# Patient Record
Sex: MALE | Race: WHITE | Employment: PART TIME | URBAN - NONMETROPOLITAN AREA
[De-identification: names, ages, dates, MRNs, and addresses within clinical notes are randomized per-mention and may not be internally consistent; named-entity substitution may affect disease eponyms.]

---

## 2019-05-05 ENCOUNTER — HOSPITAL ENCOUNTER (EMERGENCY)
Age: 19
Discharge: HOME | End: 2019-05-05
Payer: COMMERCIAL

## 2019-05-05 VITALS
SYSTOLIC BLOOD PRESSURE: 145 MMHG | WEIGHT: 140 LBS | RESPIRATION RATE: 16 BRPM | HEIGHT: 72 IN | HEART RATE: 104 BPM | TEMPERATURE: 97.7 F | OXYGEN SATURATION: 100 % | DIASTOLIC BLOOD PRESSURE: 83 MMHG | BODY MASS INDEX: 18.96 KG/M2

## 2019-05-05 DIAGNOSIS — K40.90 RIGHT INGUINAL HERNIA: Primary | ICD-10-CM

## 2019-05-05 PROCEDURE — 99202 OFFICE O/P NEW SF 15 MIN: CPT

## 2019-05-05 PROCEDURE — 99203 OFFICE O/P NEW LOW 30 MIN: CPT | Performed by: NURSE PRACTITIONER

## 2019-05-05 SDOH — HEALTH STABILITY: MENTAL HEALTH: HOW OFTEN DO YOU HAVE A DRINK CONTAINING ALCOHOL?: NEVER

## 2019-05-05 ASSESSMENT — ENCOUNTER SYMPTOMS
NAUSEA: 0
SHORTNESS OF BREATH: 0
VOMITING: 0

## 2019-05-05 NOTE — ED TRIAGE NOTES
Patient states he has pain in the lower mid abdomen that travels to his testicles. Pain is intermittent and is not in any pain at this time.

## 2019-05-05 NOTE — ED NOTES
Discharge instructions reviewed with pt. Pt verbalized understanding. Pt ambulated out in stable condition. Assessment unchanged upon discharge.      Teofilo Desir RN  05/05/19 5135

## 2019-05-05 NOTE — ED PROVIDER NOTES
from the following:    Height as of this encounter: 6' (1.829 m). Weight as of this encounter: 140 lb (63.5 kg). ,No LMP for male patient. Physical Exam   Constitutional: He is oriented to person, place, and time. He appears well-developed and well-nourished. He is cooperative. No distress. HENT:   Head: Normocephalic and atraumatic. Pulmonary/Chest: Effort normal. No respiratory distress. Abdominal: Soft. Bowel sounds are normal. There is no tenderness. A hernia is present. Hernia confirmed positive in the right inguinal area. Neurological: He is alert and oriented to person, place, and time. Skin: Skin is warm and dry. Psychiatric: He has a normal mood and affect. His speech is normal and behavior is normal.   Nursing note and vitals reviewed. DIAGNOSTIC RESULTS     Labs:No results found for this visit on 05/05/19. IMAGING:    No orders to display         URGENT CARE COURSE:     Vitals:    05/05/19 1525   BP: (!) 145/83   Pulse: 104   Resp: 16   Temp: 97.7 °F (36.5 °C)   SpO2: 100%   Weight: 140 lb (63.5 kg)   Height: 6' (1.829 m)       Medications - No data to display         PROCEDURES:  None    FINAL IMPRESSION      1. Right inguinal hernia          DISPOSITION/ PLAN     Patient presents with a right inguinal hernia. Currently, the hernia is knots present except for a small area in the upper inguinal area. The patient states the hernia doesn't times extend into his scrotum. He is able to reduce the hernia when it does appear. He was given a referral to Dr. Julien Leventhal for evaluation and possible repair of the hernia. He is to go to ER if he is unable to reduce the hernia or has any significant pain with a hernia. All questions were answered and the patient was in agreement with plan. He was discharged from the urgent care center in good condition. PATIENT REFERRED TO:  No primary care provider on file. No primary physician on file.       DISCHARGE MEDICATIONS:  There are no discharge medications for this patient. There are no discharge medications for this patient. There are no discharge medications for this patient.       DICKSON Ibrahim - CNP    (Please note that portions of this note were completed with a voice recognition program. Efforts were made to edit the dictations but occasionally words are mis-transcribed.)         DICKSON Ibrahim - THUY  05/05/19 8157

## 2019-05-09 ENCOUNTER — OFFICE VISIT (OUTPATIENT)
Dept: SURGERY | Age: 19
End: 2019-05-09
Payer: COMMERCIAL

## 2019-05-09 ENCOUNTER — TELEPHONE (OUTPATIENT)
Dept: SURGERY | Age: 19
End: 2019-05-09

## 2019-05-09 VITALS
TEMPERATURE: 97.3 F | SYSTOLIC BLOOD PRESSURE: 106 MMHG | OXYGEN SATURATION: 98 % | HEIGHT: 72 IN | RESPIRATION RATE: 16 BRPM | WEIGHT: 139 LBS | BODY MASS INDEX: 18.83 KG/M2 | DIASTOLIC BLOOD PRESSURE: 68 MMHG | HEART RATE: 95 BPM

## 2019-05-09 DIAGNOSIS — K40.90 INGUINAL HERNIA, RIGHT: Primary | ICD-10-CM

## 2019-05-09 PROCEDURE — 99203 OFFICE O/P NEW LOW 30 MIN: CPT | Performed by: SURGERY

## 2019-05-09 ASSESSMENT — ENCOUNTER SYMPTOMS
STRIDOR: 0
COUGH: 0
GASTROINTESTINAL NEGATIVE: 1
TROUBLE SWALLOWING: 0
SINUS PAIN: 0
SINUS PRESSURE: 0
EYE REDNESS: 0
SHORTNESS OF BREATH: 0
EYE DISCHARGE: 0
RESPIRATORY NEGATIVE: 1
WHEEZING: 0
FACIAL SWELLING: 0
APNEA: 0
CHOKING: 0
BACK PAIN: 0
EYE ITCHING: 0
PHOTOPHOBIA: 0
COLOR CHANGE: 0
RHINORRHEA: 0
EYE PAIN: 0
CHEST TIGHTNESS: 0
SORE THROAT: 0
EYES NEGATIVE: 1
ALLERGIC/IMMUNOLOGIC NEGATIVE: 1
VOICE CHANGE: 0

## 2019-05-09 NOTE — TELEPHONE ENCOUNTER
Scheduled Onita Loud for an open right inguinal hernia repair on Fri 5/17 at 10am & he will arrive at 8:30. Consent was signed.

## 2019-05-10 NOTE — PROGRESS NOTES
[address] Akira Thomas [address]  [address] [address]  Dept: [phone]  Dept Fax: [phone]  Loc: [phone]    Visit Date: 5/9/2019    Mis Mariscal is a 22 y.o. male who presentstoday for:  Chief Complaint   Patient presents with    Surgical Consult     new pt-was in urgent care 5/5-rt inguinal hernia-       HPI:     HPI 69-year-old white male who recently went to the urgent care center because he is having problems with a right inguinal hernia. He indicated to me he is had a swollen testicle for most of his life and was familiar with the concept of a hydrocele. Recently it is gotten larger he states that in the morning hernia and scrotal swelling is gone and throughout the day and at night scrotum is larger with a bigger bulge in the right groin he has no voiding abnormalities denies any family history of hernias patient works full time and also goes to school full-time    History reviewed. No pertinent past medical history. Past Surgical History:   Procedure Laterality Date    COCHLEAR IMPLANT  2017    left      History reviewed. No pertinent family history. Social History     Tobacco Use    Smoking status: Never Smoker    Smokeless tobacco: Never Used   Substance Use Topics    Alcohol use: Never     Frequency: Never          No current outpatient medications on file. No current facility-administered medications for this visit. No Known Allergies    Subjective:      Review of Systems   Constitutional: Negative. Negative for activity change, appetite change, chills, diaphoresis, fatigue, fever and unexpected weight change. HENT: Negative. Negative for congestion, dental problem, drooling, ear discharge, ear pain, facial swelling, hearing loss, mouth sores, nosebleeds, postnasal drip, rhinorrhea, sinus pressure, sinus pain, sneezing, sore throat, tinnitus, trouble swallowing and voice change. Eyes: Negative.   Negative for photophobia, pain, discharge, redness, itching and visual disturbance. Respiratory: Negative. Negative for apnea, cough, choking, chest tightness, shortness of breath, wheezing and stridor. Cardiovascular: Negative. Negative for chest pain, palpitations and leg swelling. Gastrointestinal: Negative. Endocrine: Negative. Negative for cold intolerance, heat intolerance, polydipsia, polyphagia and polyuria. Genitourinary: Negative. Negative for decreased urine volume, difficulty urinating, discharge, dysuria, enuresis, flank pain, frequency, genital sores, hematuria, penile pain, penile swelling, scrotal swelling, testicular pain and urgency. Musculoskeletal: Negative. Negative for arthralgias, back pain, gait problem, joint swelling, myalgias, neck pain and neck stiffness. Skin: Negative. Negative for color change, pallor, rash and wound. Allergic/Immunologic: Negative. Negative for environmental allergies, food allergies and immunocompromised state. Neurological: Negative. Negative for dizziness, tremors, seizures, syncope, facial asymmetry, speech difficulty, weakness, light-headedness, numbness and headaches. Hematological: Negative. Negative for adenopathy. Does not bruise/bleed easily. Psychiatric/Behavioral: Negative. Negative for agitation, behavioral problems, confusion, decreased concentration, dysphoric mood, hallucinations, self-injury, sleep disturbance and suicidal ideas. The patient is not nervous/anxious and is not hyperactive. Objective:   /68 (Site: Left Upper Arm, Position: Sitting, Cuff Size: Medium Adult)   Pulse 95   Temp 97.3 °F (36.3 °C) (Tympanic)   Resp 16   Ht 6' (1.829 m)   Wt 139 lb (63 kg)   SpO2 98%   BMI 18.85 kg/m²     Physical Exam   Constitutional: He is oriented to person, place, and time. He appears well-developed and well-nourished. HENT:   Head: Normocephalic and atraumatic.    Eyes: Pupils are equal, round, and reactive to light. Conjunctivae and EOM are normal. Left eye exhibits no discharge. No scleral icterus. Neck: No tracheal deviation present. No thyromegaly present. Cardiovascular: Normal rate, regular rhythm and normal heart sounds. Exam reveals no gallop and no friction rub. No murmur heard. Pulmonary/Chest: Effort normal and breath sounds normal. No respiratory distress. He has no wheezes. He has no rales. He exhibits no tenderness. Genitourinary:         Musculoskeletal: Normal range of motion. He exhibits no edema or tenderness. Lymphadenopathy:     He has no cervical adenopathy. Neurological: He is alert and oriented to person, place, and time. Skin: Skin is warm and dry. No rash noted. No erythema. No pallor. Psychiatric: He has a normal mood and affect. His behavior is normal. Judgment and thought content normal.        No results found for this or any previous visit. Assessment:     Right inguinal hernia with communicating hydrocele but apparently has been present a good part of his life we discussed hernias and the fact that he has an indirect hernia we discussed the options of repair with robotic-assisted versus open my concern is that it might be difficult to reduce robotically an indirect hernia that goes all way down into the scrotum as far as the hernia sac we discussed the open repair use of possible mesh he would like to proceed    Plan:     1. Schedule Meli Cabrera for open repair of right inguinal hernia  2. The risks, benefits and alternatives were discussed with Meli Cabrera. He understands and wishes to proceed with surgical intervention. 3. Restrictions discussed with Meli Cabrera and he expresses understanding. 4. He is advised to call back directly if there are further questions/concerns, or if his symptoms worsen prior to surgery. Electronicallysigned by Malik Robertson MD on 5/9/2019 at 9:51 PM Patient seen and examined independently by me. Above discussed and I agree with CNP.    Labs, cultures, and radiographs where available were reviewed. See orders for the updated patient care plan.     Nathan Flynn MD,   5/9/2019   9:56 PM open repair of right inguinal hernia

## 2019-05-13 ENCOUNTER — TELEPHONE (OUTPATIENT)
Dept: SURGERY | Age: 19
End: 2019-05-13

## 2019-05-13 NOTE — TELEPHONE ENCOUNTER
Scheduled Russel Hatch for an open right inguinal hernia repair on Fri 5/17 at 10 & he will arrive at 8:30. He will be npo after midnight, consent was signed. Antibacterial soap was given.

## 2019-05-14 ENCOUNTER — TELEPHONE (OUTPATIENT)
Dept: SURGERY | Age: 19
End: 2019-05-14

## 2019-05-14 DIAGNOSIS — K40.90 INGUINAL HERNIA, RIGHT: Primary | ICD-10-CM

## 2019-05-16 NOTE — H&P
[address] Akira Thomas [address]  [address] [address]  Dept: [phone]  Dept Fax: [phone]  Loc: [phone]    Visit Date: 5/9/2019    No Andres is a 22 y.o. male who presentstoday for:  Chief Complaint   Patient presents with    Surgical Consult     new pt-was in urgent care 5/5-rt inguinal hernia-       HPI:     HPI 65-year-old white male who recently went to the urgent care center because he is having problems with a right inguinal hernia. He indicated to me he is had a swollen testicle for most of his life and was familiar with the concept of a hydrocele. Recently it is gotten larger he states that in the morning hernia and scrotal swelling is gone and throughout the day and at night scrotum is larger with a bigger bulge in the right groin he has no voiding abnormalities denies any family history of hernias patient works full time and also goes to school full-time    History reviewed. No pertinent past medical history. Past Surgical History:   Procedure Laterality Date    COCHLEAR IMPLANT  2017    left      History reviewed. No pertinent family history. Social History     Tobacco Use    Smoking status: Never Smoker    Smokeless tobacco: Never Used   Substance Use Topics    Alcohol use: Never     Frequency: Never          No current outpatient medications on file. No current facility-administered medications for this visit. No Known Allergies    Subjective:      Review of Systems   Constitutional: Negative. Negative for activity change, appetite change, chills, diaphoresis, fatigue, fever and unexpected weight change. HENT: Negative. Negative for congestion, dental problem, drooling, ear discharge, ear pain, facial swelling, hearing loss, mouth sores, nosebleeds, postnasal drip, rhinorrhea, sinus pressure, sinus pain, sneezing, sore throat, tinnitus, trouble swallowing and voice change. Eyes: Negative.   Negative for photophobia, pain, discharge, redness, itching and visual disturbance. Respiratory: Negative. Negative for apnea, cough, choking, chest tightness, shortness of breath, wheezing and stridor. Cardiovascular: Negative. Negative for chest pain, palpitations and leg swelling. Gastrointestinal: Negative. Endocrine: Negative. Negative for cold intolerance, heat intolerance, polydipsia, polyphagia and polyuria. Genitourinary: Negative. Negative for decreased urine volume, difficulty urinating, discharge, dysuria, enuresis, flank pain, frequency, genital sores, hematuria, penile pain, penile swelling, scrotal swelling, testicular pain and urgency. Musculoskeletal: Negative. Negative for arthralgias, back pain, gait problem, joint swelling, myalgias, neck pain and neck stiffness. Skin: Negative. Negative for color change, pallor, rash and wound. Allergic/Immunologic: Negative. Negative for environmental allergies, food allergies and immunocompromised state. Neurological: Negative. Negative for dizziness, tremors, seizures, syncope, facial asymmetry, speech difficulty, weakness, light-headedness, numbness and headaches. Hematological: Negative. Negative for adenopathy. Does not bruise/bleed easily. Psychiatric/Behavioral: Negative. Negative for agitation, behavioral problems, confusion, decreased concentration, dysphoric mood, hallucinations, self-injury, sleep disturbance and suicidal ideas. The patient is not nervous/anxious and is not hyperactive. Objective:   /68 (Site: Left Upper Arm, Position: Sitting, Cuff Size: Medium Adult)   Pulse 95   Temp 97.3 °F (36.3 °C) (Tympanic)   Resp 16   Ht 6' (1.829 m)   Wt 139 lb (63 kg)   SpO2 98%   BMI 18.85 kg/m²     Physical Exam   Constitutional: He is oriented to person, place, and time. He appears well-developed and well-nourished. HENT:   Head: Normocephalic and atraumatic.    Eyes: Pupils are equal, round, and reactive to light. Conjunctivae and EOM are normal. Left eye exhibits no discharge. No scleral icterus. Neck: No tracheal deviation present. No thyromegaly present. Cardiovascular: Normal rate, regular rhythm and normal heart sounds. Exam reveals no gallop and no friction rub. No murmur heard. Pulmonary/Chest: Effort normal and breath sounds normal. No respiratory distress. He has no wheezes. He has no rales. He exhibits no tenderness. Genitourinary:         Musculoskeletal: Normal range of motion. He exhibits no edema or tenderness. Lymphadenopathy:     He has no cervical adenopathy. Neurological: He is alert and oriented to person, place, and time. Skin: Skin is warm and dry. No rash noted. No erythema. No pallor. Psychiatric: He has a normal mood and affect. His behavior is normal. Judgment and thought content normal.        No results found for this or any previous visit. Assessment:     Right inguinal hernia with communicating hydrocele but apparently has been present a good part of his life we discussed hernias and the fact that he has an indirect hernia we discussed the options of repair with robotic-assisted versus open my concern is that it might be difficult to reduce robotically an indirect hernia that goes all way down into the scrotum as far as the hernia sac we discussed the open repair use of possible mesh he would like to proceed    Plan:     1. Schedule Meli Cabrera for open repair of right inguinal hernia  2. The risks, benefits and alternatives were discussed with Meli Cabrera. He understands and wishes to proceed with surgical intervention. 3. Restrictions discussed with Meli Cabrera and he expresses understanding. 4. He is advised to call back directly if there are further questions/concerns, or if his symptoms worsen prior to surgery. Electronicallysigned by Malik Robertson MD on 5/9/2019 at 9:51 PM Patient seen and examined independently by me. Above discussed and I agree with CNP.    Labs, cultures, and radiographs where available were reviewed. See orders for the updated patient care plan.     Sven Maher MD,   5/9/2019   9:56 PM open repair of right inguinal hernia

## 2019-05-16 NOTE — H&P
[facility]  History and Physical Update      Pt Name: No Andres  MRN: [id number]  YOB: 2000  Date of evaluation: 5/16/2019    [x] I have examined the patient and reviewed the H&P/Consult and there are no changes to the patient or plans. [] I have examined the patient and reviewed the H&P/Consult and have noted the following changes:         Eugenia Shaffer  Electronically signed 5/16/2019 at 10:47 AM

## 2019-05-17 ENCOUNTER — ANESTHESIA EVENT (OUTPATIENT)
Dept: OPERATING ROOM | Age: 19
End: 2019-05-17
Payer: COMMERCIAL

## 2019-05-17 ENCOUNTER — ANESTHESIA (OUTPATIENT)
Dept: OPERATING ROOM | Age: 19
End: 2019-05-17
Payer: COMMERCIAL

## 2019-05-17 ENCOUNTER — HOSPITAL ENCOUNTER (OUTPATIENT)
Age: 19
Setting detail: OUTPATIENT SURGERY
Discharge: HOME | End: 2019-05-17
Attending: SURGERY | Admitting: SURGERY
Payer: COMMERCIAL

## 2019-05-17 VITALS
OXYGEN SATURATION: 99 % | TEMPERATURE: 97.7 F | RESPIRATION RATE: 18 BRPM | SYSTOLIC BLOOD PRESSURE: 109 MMHG | DIASTOLIC BLOOD PRESSURE: 60 MMHG

## 2019-05-17 VITALS
BODY MASS INDEX: 17.95 KG/M2 | TEMPERATURE: 97.4 F | OXYGEN SATURATION: 100 % | DIASTOLIC BLOOD PRESSURE: 78 MMHG | SYSTOLIC BLOOD PRESSURE: 138 MMHG | WEIGHT: 132.5 LBS | HEIGHT: 72 IN | HEART RATE: 79 BPM | RESPIRATION RATE: 18 BRPM

## 2019-05-17 DIAGNOSIS — K40.90 NON-RECURRENT UNILATERAL INGUINAL HERNIA WITHOUT OBSTRUCTION OR GANGRENE: Primary | ICD-10-CM

## 2019-05-17 PROCEDURE — 7100000000 HC PACU RECOVERY - FIRST 15 MIN: Performed by: SURGERY

## 2019-05-17 PROCEDURE — C1781 MESH (IMPLANTABLE): HCPCS | Performed by: SURGERY

## 2019-05-17 PROCEDURE — 3700000000 HC ANESTHESIA ATTENDED CARE: Performed by: SURGERY

## 2019-05-17 PROCEDURE — 3700000001 HC ADD 15 MINUTES (ANESTHESIA): Performed by: SURGERY

## 2019-05-17 PROCEDURE — 2709999900 HC NON-CHARGEABLE SUPPLY: Performed by: SURGERY

## 2019-05-17 PROCEDURE — 7100000010 HC PHASE II RECOVERY - FIRST 15 MIN: Performed by: SURGERY

## 2019-05-17 PROCEDURE — 49505 PRP I/HERN INIT REDUC >5 YR: CPT | Performed by: SURGERY

## 2019-05-17 PROCEDURE — 3600000002 HC SURGERY LEVEL 2 BASE: Performed by: SURGERY

## 2019-05-17 PROCEDURE — 2580000003 HC RX 258: Performed by: NURSE ANESTHETIST, CERTIFIED REGISTERED

## 2019-05-17 PROCEDURE — 2500000003 HC RX 250 WO HCPCS: Performed by: NURSE ANESTHETIST, CERTIFIED REGISTERED

## 2019-05-17 PROCEDURE — 2580000003 HC RX 258

## 2019-05-17 PROCEDURE — 6360000002 HC RX W HCPCS: Performed by: NURSE ANESTHETIST, CERTIFIED REGISTERED

## 2019-05-17 PROCEDURE — 3600000012 HC SURGERY LEVEL 2 ADDTL 15MIN: Performed by: SURGERY

## 2019-05-17 PROCEDURE — 7100000011 HC PHASE II RECOVERY - ADDTL 15 MIN: Performed by: SURGERY

## 2019-05-17 PROCEDURE — 55060 REPAIR OF HYDROCELE: CPT | Performed by: SURGERY

## 2019-05-17 PROCEDURE — 6360000002 HC RX W HCPCS

## 2019-05-17 PROCEDURE — 2500000003 HC RX 250 WO HCPCS: Performed by: SURGERY

## 2019-05-17 PROCEDURE — 6360000002 HC RX W HCPCS: Performed by: ANESTHESIOLOGY

## 2019-05-17 PROCEDURE — 7100000001 HC PACU RECOVERY - ADDTL 15 MIN: Performed by: SURGERY

## 2019-05-17 DEVICE — MESH HERN W1XL4IN INGUINAL POLYPR MFIL RECTANG: Type: IMPLANTABLE DEVICE | Site: GROIN | Status: FUNCTIONAL

## 2019-05-17 RX ORDER — DIPHENHYDRAMINE HYDROCHLORIDE 50 MG/ML
12.5 INJECTION INTRAMUSCULAR; INTRAVENOUS
Status: DISCONTINUED | OUTPATIENT
Start: 2019-05-17 | End: 2019-05-17 | Stop reason: HOSPADM

## 2019-05-17 RX ORDER — CEFAZOLIN SODIUM 1 G/50ML
1 INJECTION, SOLUTION INTRAVENOUS
Status: COMPLETED | OUTPATIENT
Start: 2019-05-17 | End: 2019-05-17

## 2019-05-17 RX ORDER — MIDAZOLAM HYDROCHLORIDE 1 MG/ML
INJECTION INTRAMUSCULAR; INTRAVENOUS PRN
Status: DISCONTINUED | OUTPATIENT
Start: 2019-05-17 | End: 2019-05-17 | Stop reason: SDUPTHER

## 2019-05-17 RX ORDER — LABETALOL HYDROCHLORIDE 5 MG/ML
5 INJECTION, SOLUTION INTRAVENOUS EVERY 5 MIN PRN
Status: DISCONTINUED | OUTPATIENT
Start: 2019-05-17 | End: 2019-05-17 | Stop reason: HOSPADM

## 2019-05-17 RX ORDER — BUPIVACAINE HYDROCHLORIDE 5 MG/ML
INJECTION, SOLUTION EPIDURAL; INTRACAUDAL PRN
Status: DISCONTINUED | OUTPATIENT
Start: 2019-05-17 | End: 2019-05-17 | Stop reason: ALTCHOICE

## 2019-05-17 RX ORDER — GLYCOPYRROLATE 1 MG/5 ML
SYRINGE (ML) INTRAVENOUS PRN
Status: DISCONTINUED | OUTPATIENT
Start: 2019-05-17 | End: 2019-05-17 | Stop reason: SDUPTHER

## 2019-05-17 RX ORDER — ROCURONIUM BROMIDE 10 MG/ML
INJECTION, SOLUTION INTRAVENOUS PRN
Status: DISCONTINUED | OUTPATIENT
Start: 2019-05-17 | End: 2019-05-17 | Stop reason: SDUPTHER

## 2019-05-17 RX ORDER — MORPHINE SULFATE 2 MG/ML
2 INJECTION, SOLUTION INTRAMUSCULAR; INTRAVENOUS EVERY 5 MIN PRN
Status: DISCONTINUED | OUTPATIENT
Start: 2019-05-17 | End: 2019-05-17 | Stop reason: HOSPADM

## 2019-05-17 RX ORDER — HYDRALAZINE HYDROCHLORIDE 20 MG/ML
5 INJECTION INTRAMUSCULAR; INTRAVENOUS EVERY 10 MIN PRN
Status: DISCONTINUED | OUTPATIENT
Start: 2019-05-17 | End: 2019-05-17 | Stop reason: HOSPADM

## 2019-05-17 RX ORDER — OXYCODONE HYDROCHLORIDE AND ACETAMINOPHEN 5; 325 MG/1; MG/1
1 TABLET ORAL EVERY 6 HOURS PRN
Qty: 16 TABLET | Refills: 0 | Status: SHIPPED | OUTPATIENT
Start: 2019-05-17 | End: 2019-05-20

## 2019-05-17 RX ORDER — SODIUM CHLORIDE 9 MG/ML
INJECTION, SOLUTION INTRAVENOUS CONTINUOUS
Status: DISCONTINUED | OUTPATIENT
Start: 2019-05-17 | End: 2019-05-17 | Stop reason: HOSPADM

## 2019-05-17 RX ORDER — FENTANYL CITRATE 50 UG/ML
50 INJECTION, SOLUTION INTRAMUSCULAR; INTRAVENOUS EVERY 5 MIN PRN
Status: DISCONTINUED | OUTPATIENT
Start: 2019-05-17 | End: 2019-05-17 | Stop reason: HOSPADM

## 2019-05-17 RX ORDER — SODIUM CHLORIDE 9 MG/ML
INJECTION, SOLUTION INTRAVENOUS CONTINUOUS PRN
Status: DISCONTINUED | OUTPATIENT
Start: 2019-05-17 | End: 2019-05-17 | Stop reason: SDUPTHER

## 2019-05-17 RX ORDER — DEXAMETHASONE SODIUM PHOSPHATE 4 MG/ML
INJECTION, SOLUTION INTRA-ARTICULAR; INTRALESIONAL; INTRAMUSCULAR; INTRAVENOUS; SOFT TISSUE PRN
Status: DISCONTINUED | OUTPATIENT
Start: 2019-05-17 | End: 2019-05-17 | Stop reason: SDUPTHER

## 2019-05-17 RX ORDER — NEOSTIGMINE METHYLSULFATE 5 MG/5 ML
SYRINGE (ML) INTRAVENOUS PRN
Status: DISCONTINUED | OUTPATIENT
Start: 2019-05-17 | End: 2019-05-17 | Stop reason: SDUPTHER

## 2019-05-17 RX ORDER — ONDANSETRON 2 MG/ML
INJECTION INTRAMUSCULAR; INTRAVENOUS PRN
Status: DISCONTINUED | OUTPATIENT
Start: 2019-05-17 | End: 2019-05-17 | Stop reason: SDUPTHER

## 2019-05-17 RX ORDER — MEPERIDINE HYDROCHLORIDE 25 MG/ML
12.5 INJECTION INTRAMUSCULAR; INTRAVENOUS; SUBCUTANEOUS EVERY 5 MIN PRN
Status: DISCONTINUED | OUTPATIENT
Start: 2019-05-17 | End: 2019-05-17 | Stop reason: HOSPADM

## 2019-05-17 RX ORDER — LIDOCAINE HYDROCHLORIDE 20 MG/ML
INJECTION, SOLUTION INFILTRATION; PERINEURAL PRN
Status: DISCONTINUED | OUTPATIENT
Start: 2019-05-17 | End: 2019-05-17 | Stop reason: SDUPTHER

## 2019-05-17 RX ORDER — KETOROLAC TROMETHAMINE 30 MG/ML
INJECTION, SOLUTION INTRAMUSCULAR; INTRAVENOUS PRN
Status: DISCONTINUED | OUTPATIENT
Start: 2019-05-17 | End: 2019-05-17 | Stop reason: SDUPTHER

## 2019-05-17 RX ORDER — FENTANYL CITRATE 50 UG/ML
INJECTION, SOLUTION INTRAMUSCULAR; INTRAVENOUS PRN
Status: DISCONTINUED | OUTPATIENT
Start: 2019-05-17 | End: 2019-05-17 | Stop reason: SDUPTHER

## 2019-05-17 RX ORDER — ONDANSETRON 2 MG/ML
4 INJECTION INTRAMUSCULAR; INTRAVENOUS
Status: DISCONTINUED | OUTPATIENT
Start: 2019-05-17 | End: 2019-05-17 | Stop reason: HOSPADM

## 2019-05-17 RX ORDER — PROPOFOL 10 MG/ML
INJECTION, EMULSION INTRAVENOUS PRN
Status: DISCONTINUED | OUTPATIENT
Start: 2019-05-17 | End: 2019-05-17 | Stop reason: SDUPTHER

## 2019-05-17 RX ADMIN — FENTANYL CITRATE 100 MCG: 50 INJECTION INTRAMUSCULAR; INTRAVENOUS at 10:03

## 2019-05-17 RX ADMIN — LIDOCAINE HYDROCHLORIDE 100 MG: 20 INJECTION, SOLUTION INFILTRATION; PERINEURAL at 10:03

## 2019-05-17 RX ADMIN — DEXAMETHASONE SODIUM PHOSPHATE 8 MG: 4 INJECTION, SOLUTION INTRAMUSCULAR; INTRAVENOUS at 10:10

## 2019-05-17 RX ADMIN — CEFAZOLIN SODIUM 1 G: 1 INJECTION, SOLUTION INTRAVENOUS at 10:08

## 2019-05-17 RX ADMIN — MEPERIDINE HYDROCHLORIDE 12.5 MG: 25 INJECTION INTRAMUSCULAR; INTRAVENOUS; SUBCUTANEOUS at 11:25

## 2019-05-17 RX ADMIN — KETOROLAC TROMETHAMINE 30 MG: 30 INJECTION, SOLUTION INTRAMUSCULAR; INTRAVENOUS at 10:10

## 2019-05-17 RX ADMIN — MIDAZOLAM HYDROCHLORIDE 2 MG: 1 INJECTION, SOLUTION INTRAMUSCULAR; INTRAVENOUS at 10:03

## 2019-05-17 RX ADMIN — SODIUM CHLORIDE: 9 INJECTION, SOLUTION INTRAVENOUS at 09:33

## 2019-05-17 RX ADMIN — ROCURONIUM BROMIDE 10 MG: 10 INJECTION INTRAVENOUS at 10:47

## 2019-05-17 RX ADMIN — PROPOFOL 200 MG: 10 INJECTION, EMULSION INTRAVENOUS at 10:03

## 2019-05-17 RX ADMIN — ROCURONIUM BROMIDE 30 MG: 10 INJECTION INTRAVENOUS at 10:05

## 2019-05-17 RX ADMIN — Medication 0.8 MG: at 11:04

## 2019-05-17 RX ADMIN — ONDANSETRON HYDROCHLORIDE 4 MG: 4 INJECTION, SOLUTION INTRAMUSCULAR; INTRAVENOUS at 10:10

## 2019-05-17 RX ADMIN — SODIUM CHLORIDE: 9 INJECTION, SOLUTION INTRAVENOUS at 10:03

## 2019-05-17 RX ADMIN — Medication 4 MG: at 11:04

## 2019-05-17 ASSESSMENT — PULMONARY FUNCTION TESTS
PIF_VALUE: 12
PIF_VALUE: 1
PIF_VALUE: 14
PIF_VALUE: 12
PIF_VALUE: 12
PIF_VALUE: 14
PIF_VALUE: 12
PIF_VALUE: 14
PIF_VALUE: 12
PIF_VALUE: 2
PIF_VALUE: 14
PIF_VALUE: 26
PIF_VALUE: 12
PIF_VALUE: 11
PIF_VALUE: 15
PIF_VALUE: 14
PIF_VALUE: 5
PIF_VALUE: 14
PIF_VALUE: 12
PIF_VALUE: 10
PIF_VALUE: 14
PIF_VALUE: 12
PIF_VALUE: 12
PIF_VALUE: 14
PIF_VALUE: 1
PIF_VALUE: 14
PIF_VALUE: 14
PIF_VALUE: 12
PIF_VALUE: 14
PIF_VALUE: 19
PIF_VALUE: 12
PIF_VALUE: 12
PIF_VALUE: 14
PIF_VALUE: 12
PIF_VALUE: 10
PIF_VALUE: 12
PIF_VALUE: 9
PIF_VALUE: 1
PIF_VALUE: 12
PIF_VALUE: 14
PIF_VALUE: 14
PIF_VALUE: 1
PIF_VALUE: 12
PIF_VALUE: 19
PIF_VALUE: 12
PIF_VALUE: 12
PIF_VALUE: 14
PIF_VALUE: 12
PIF_VALUE: 14

## 2019-05-17 ASSESSMENT — PAIN DESCRIPTION - PAIN TYPE
TYPE: SURGICAL PAIN
TYPE: SURGICAL PAIN

## 2019-05-17 ASSESSMENT — PAIN SCALES - GENERAL
PAINLEVEL_OUTOF10: 0
PAINLEVEL_OUTOF10: 2
PAINLEVEL_OUTOF10: 1
PAINLEVEL_OUTOF10: 2
PAINLEVEL_OUTOF10: 2
PAINLEVEL_OUTOF10: 0
PAINLEVEL_OUTOF10: 0

## 2019-05-17 ASSESSMENT — PAIN DESCRIPTION - ORIENTATION
ORIENTATION: RIGHT
ORIENTATION: RIGHT

## 2019-05-17 ASSESSMENT — PAIN DESCRIPTION - LOCATION
LOCATION: GROIN
LOCATION: GROIN

## 2019-05-17 ASSESSMENT — PAIN - FUNCTIONAL ASSESSMENT: PAIN_FUNCTIONAL_ASSESSMENT: 0-10

## 2019-05-17 NOTE — BRIEF OP NOTE
Brief Postoperative Note  ______________________________________________________________    Patient: Helder Sewell  YOB: 2000  MRN: [id number]  Date of Procedure: 5/17/2019    Pre-Op Diagnosis: RIGHT INGUINAL HERINA    Post-Op Diagnosis: Same       Procedure(s):  OPEN RIGHT INGUINAL HERNIA REPAIR, POSSIBLE MESH    Anesthesia: General    Surgeon(s):  Hesham Freitas MD    Assistant:     Estimated Blood Loss (mL):none    Complications: none    Specimens:       Implants:  Implant Name Type Inv.  Item Serial No.  Lot No. LRB No. Used   GRAFT [address] [facility] SURG ALEA GROIN SHT ST 1X4IN Mesh GRAFT [address] 1X4IN  CR [employer] [id number] Right 1         [employer]    Findings: see op note    Hesham Freitas MD  Date: 5/17/2019  Time: 11:23 AM

## 2019-05-17 NOTE — PROGRESS NOTES
Pt has met discharge criteria and states he is ready for discharge to home. IV removed, gauze and tape applied. Dressed in own clothes and personal belongings gathered. Discharge instructions given to pt and family; pt and family verbalized understanding of discharge instructions, prescriptions and follow up appointments. Pt transported to discharge lobby by [facility] staff.

## 2019-05-17 NOTE — FLOWSHEET NOTE
Pt arrived to [address] [place] [address] 12. Family at the bedside. Vitals and assessment as charted. Pt eating and drinking. Call light in reach.

## 2019-05-17 NOTE — ANESTHESIA PRE PROCEDURE
Department of Anesthesiology  Preprocedure Note       Name:  Kit Barkley   Age:  22 y.o.  :  2000                                          MRN:  [id number]         Date:  2019      Surgeon: Dimas Miles):  Davey Rodriguez MD    Procedure: OPEN RIGHT INGUINAL HERNIA REPAIR, POSSIBLE MESH (Right Groin)    Medications prior to admission:   Prior to Admission medications    Not on File       Current medications:    Current Facility-Administered Medications   Medication Dose Route Frequency Provider Last Rate Last Dose    0.9 % sodium chloride infusion   Intravenous Continuous Crystal Camper, LPN        ceFAZolin (ANCEF) 1 g in dextrose 5 % 50 mL IVPB (mini-bag)  1 g Intravenous 30 Min Pre-Op Crystal Camper, LPN           Allergies:  No Known Allergies    Problem List:  There is no problem list on file for this patient. Past Medical History:  No past medical history on file. Past Surgical History:        Procedure Laterality Date    COCHLEAR IMPLANT  2017    left       Social History:    Social History     Tobacco Use    Smoking status: Never Smoker    Smokeless tobacco: Never Used   Substance Use Topics    Alcohol use: Never     Frequency: Never                                Counseling given: Not Answered      Vital Signs (Current):   Vitals:    19 0910   BP: 127/70   Pulse: 85   Resp: 18   Temp: 98.3 °F (36.8 °C)   TempSrc: Temporal   SpO2: 98%                                              BP Readings from Last 3 Encounters:   19 127/70   19 106/68   19 (!) 145/83       NPO Status:                                                                                 BMI:   Wt Readings from Last 3 Encounters:   19 139 lb (63 kg) (24 %, Z= -0.70)*   19 140 lb (63.5 kg) (26 %, Z= -0.65)*     * Growth percentiles are based on CDC (Boys, 2-20 Years) data.      There is no height or weight on file to calculate BMI.    CBC: No results found for: WBC, RBC, HGB, HCT, MCV, RDW, PLT    CMP: No results found for: NA, K, CL, CO2, BUN, CREATININE, GFRAA, AGRATIO, LABGLOM, GLUCOSE, PROT, CALCIUM, BILITOT, ALKPHOS, AST, ALT    POC Tests: No results for input(s): POCGLU, POCNA, POCK, POCCL, POCBUN, POCHEMO, POCHCT in the last 72 hours. Coags: No results found for: PROTIME, INR, APTT    HCG (If Applicable): No results found for: PREGTESTUR, PREGSERUM, HCG, HCGQUANT     ABGs: No results found for: PHART, PO2ART, HEV5KHB, SJK0RFI, BEART, I6JWEDOF     Type & Screen (If Applicable):  No results found for: LABABO, LABRH    Anesthesia Evaluation    Airway: Mallampati: II       Mouth opening: > = 3 FB Dental:          Pulmonary:       (-) COPD                           Cardiovascular:        (-) hypertension      Rhythm: regular                      Neuro/Psych:               GI/Hepatic/Renal:             Endo/Other:                     Abdominal:           Vascular:                                        Anesthesia Plan      general     ASA 1       Induction: intravenous. Anesthetic plan and risks discussed with patient and mother. Plan discussed with CRNA.                   Marilyn Villanueva MD   5/17/2019

## 2019-05-17 NOTE — OP NOTE
[address] [place] [address]                                OPERATIVE REPORT    PATIENT NAME: Martina Dela Cruz                          :        2000  MED REC NO:   [id number]                           ROOM:  ACCOUNT NO:   [de-identified]                           ADMIT DATE: 2019  PROVIDER:     Danna Zayas. Roberto Salazar MD    DATE OF PROCEDURE:  2019    PREOPERATIVE DIAGNOSIS:  Right inguinal hernia with hydrocele. POSTOPERATIVE DIAGNOSIS:  Right inguinal hernia indirect with  communicating hydrocele sac. OPERATION:  Right inguinal hernia repair with high ligation,  hydrocelectomy of the hernia sac with 1 x 4 mesh Onlay. SURGEON:  Danna Zayas. Roberto Salazar MD.    ANESTHESIA:  General.    COMPLICATIONS:  Spermatic cord was part of the hernia sac. INDICATION FOR PROCEDURE  The patient is a 69-year-old white male who  has had a long history of a hernia and what he describes as a hydrocele. FINDINGS:  The patient was challenging to repair because the hernia sac  _____. The spermatic cord was actually completely part of the hernia  sac. On further questioning of the mother after the surgery, she  indicated that he had had an undescended testicle that eventually  dropped down on its own. We will assume it was on the right side. We  used a 1 x 4 mesh to tighten up the internal ring. DESCRIPTION OF PROCEDURE:  The patient was brought to operating suite,  placed supine on the operating room table. After adequate inhalational  anesthesia was administered, the patient's right groin was prepped and  draped in an usual sterile fashion. An incision was made parallel to  the inguinal ligament, taken down through the subcutaneous tissue, the  external oblique fascia where surprisingly I expected to find a dilated  superficial ring, but it was not. I divided the external oblique fascia  down through the superficial ring.   We identified the spermatic cord and  initially it was not obvious that there was a sac part of it. As we  were dissecting a little further distally, we opened up. We found the  hernia sac, opened up into the sac, and the testicle popped right out of  the scrotum. Initially it was somewhat confusing, but then we followed this sac back,  and it did go all the way back to the internal ring. However, as we  isolated the spermatic cord further, it was actually part of the hernia  sac. There was no making a delineation of the spermatic cord. We  peeled the hernia sac off the testicle and then we did do a high  ligation, but care being taken not to incorporate the spermatic cord  into this. The floor of the groin felt firm. I then used a 1 x 4 mesh  Onlay, suturing it to pubic tubercle medially, conjoint tendon  superiorly, with attaching the others inferior and to the shelving  border of the external oblique, i.e. Poupart's ligament, and then  sleeves were wrapped around the internal ring, tightening it up. The  cord was placed on top of the mesh and then a running #0 Vicryl was used  to close the external oblique fascia, interrupted 3-0 Vicryl was used to  close subcutaneous, and running 4-0 Vicryl was used close the skin. Steri-Strips were applied. We did inject about 30 mL of Marcaine into  the fascia and in subcutaneous tissue. NORM ARECHIGA [facility] TREATMENT [facility], MD    D: 05/17/2019 11:44:38       T: 05/17/2019 11:54:52     [id number]  Job#: [id number]     Doc#: [id number]    CC:

## 2019-05-20 ENCOUNTER — TELEPHONE (OUTPATIENT)
Dept: SURGERY | Age: 19
End: 2019-05-20

## 2019-05-30 ENCOUNTER — OFFICE VISIT (OUTPATIENT)
Dept: SURGERY | Age: 19
End: 2019-05-30

## 2019-05-30 VITALS
OXYGEN SATURATION: 98 % | SYSTOLIC BLOOD PRESSURE: 118 MMHG | BODY MASS INDEX: 18.27 KG/M2 | HEART RATE: 90 BPM | RESPIRATION RATE: 18 BRPM | DIASTOLIC BLOOD PRESSURE: 78 MMHG | WEIGHT: 134.9 LBS | HEIGHT: 72 IN | TEMPERATURE: 96.5 F

## 2019-05-30 DIAGNOSIS — Z87.19 S/P INGUINAL HERNIA REPAIR: ICD-10-CM

## 2019-05-30 DIAGNOSIS — Z98.890 S/P INGUINAL HERNIA REPAIR: ICD-10-CM

## 2019-05-30 PROCEDURE — 99024 POSTOP FOLLOW-UP VISIT: CPT | Performed by: NURSE PRACTITIONER

## 2019-05-30 ASSESSMENT — ENCOUNTER SYMPTOMS
ABDOMINAL DISTENTION: 0
ABDOMINAL PAIN: 0
COLOR CHANGE: 0
EYE PAIN: 0
ANAL BLEEDING: 0
CHEST TIGHTNESS: 0
BLOOD IN STOOL: 0
CHOKING: 0
SHORTNESS OF BREATH: 0
BACK PAIN: 0
VOMITING: 0
COUGH: 0
RECTAL PAIN: 0
EYE ITCHING: 0
APNEA: 0
FACIAL SWELLING: 0
PHOTOPHOBIA: 0
TROUBLE SWALLOWING: 0
SINUS PRESSURE: 0
VOICE CHANGE: 0
SORE THROAT: 0
WHEEZING: 0
DIARRHEA: 0
EYE DISCHARGE: 0
STRIDOR: 0
CONSTIPATION: 0
NAUSEA: 0
EYE REDNESS: 0
RHINORRHEA: 0

## 2019-05-30 NOTE — LETTER
[address] [facility]  [address] [address] [facility]  Phone: [phone]  Fax: [phone]    DICKSON Powers CNP        May 30, 2019     Patient: Karl Cinseros   YOB: 2000   Date of Visit: 5/30/2019       To Whom It May Concern: It is my medical opinion that Karl Cisneros may return to work on light duty. May return to normal activity with no restricitons on July 1, 2019. .    If you have any questions or concerns, please don't hesitate to call.     Sincerely,          DICKSON Powers CNP

## 2019-05-30 NOTE — PATIENT INSTRUCTIONS
Weight restrictions of 10 lbs until July 1st  May swim in chlorinated water or salt water no ponds  Okay for work on [employer] duty  Watch for signs of infection  Follow up in 2 weeks

## 2019-05-30 NOTE — PROGRESS NOTES
[address] Akira Thomas [address]  [address] [address]  Dept: [phone]  Dept Fax: [phone]  Loc: [phone]    Visit Date: 5/30/2019       Alla Niño is a 22 y.o. male who presents today for:  Chief Complaint   Patient presents with    Post-Op Check     s/p Right inguinal hernia repair with high ligation, hydrocelectomy of the hernia sac with 1 x 4 mesh [place] 5/17       HPI:     Emely Link presents today for a post op check. Today He complains of his Right testicle is slightly retracted that is different before surgery, it is palpable and denies pain with palpation. He has no problems voiding, no problems with bowels. He is eating well. The incision looks good, no swelling. He has returned to work on light duty and is tolerating, take no pain medication at this time . We will follow up in 2 weeks to check testicle retraction. History reviewed. No pertinent past medical history. Past Surgical History:   Procedure Laterality Date    COCHLEAR IMPLANT  2017    left    HERNIA REPAIR Right 5/17/2019    OPEN RIGHT INGUINAL HERNIA REPAIR,MESH performed by Betty Proctor MD at [facility]     History reviewed. No pertinent family history. Social History     Tobacco Use    Smoking status: Never Smoker    Smokeless tobacco: Never Used   Substance Use Topics    Alcohol use: Never     Frequency: Never        No current outpatient medications on file. No current facility-administered medications for this visit. No Known Allergies    Subjective:      Review of Systems   Constitutional: Negative for activity change, appetite change, chills, diaphoresis, fatigue, fever and unexpected weight change.    HENT: Negative for congestion, dental problem, drooling, ear discharge, ear pain, facial swelling, hearing loss, mouth sores, nosebleeds, postnasal drip, rhinorrhea, sinus pressure, sneezing, sore throat, tinnitus, trouble swallowing and voice change. Eyes: Negative for photophobia, pain, discharge, redness, itching and visual disturbance. Respiratory: Negative for apnea, cough, choking, chest tightness, shortness of breath, wheezing and stridor. Cardiovascular: Negative for chest pain, palpitations and leg swelling. Gastrointestinal: Negative for abdominal distention, abdominal pain, anal bleeding, blood in stool, constipation, diarrhea, nausea, rectal pain and vomiting. Endocrine: Negative for cold intolerance, heat intolerance, polydipsia, polyphagia and polyuria. Genitourinary: Negative for decreased urine volume, difficulty urinating, dysuria, enuresis, flank pain, frequency, genital sores, hematuria and urgency. Musculoskeletal: Negative for arthralgias, back pain, gait problem, joint swelling, myalgias, neck pain and neck stiffness. Skin: Negative for color change, pallor, rash and wound. Allergic/Immunologic: Negative for environmental allergies, food allergies and immunocompromised state. Neurological: Negative for dizziness, tremors, seizures, syncope, facial asymmetry, speech difficulty, weakness, light-headedness, numbness and headaches. Hematological: Negative for adenopathy. Does not bruise/bleed easily. Psychiatric/Behavioral: Negative for agitation, behavioral problems, confusion, decreased concentration, dysphoric mood, hallucinations, self-injury, sleep disturbance and suicidal ideas. The patient is not nervous/anxious and is not hyperactive.         Objective:     /78 (Site: Left Upper Arm, Position: Sitting, Cuff Size: Medium Adult)   Pulse 90   Temp 96.5 °F (35.8 °C) (Tympanic)   Resp 18   Ht 6' (1.829 m)   Wt 134 lb 14.4 oz (61.2 kg)   SpO2 98%   BMI 18.30 kg/m²     Wt Readings from Last 3 Encounters:   05/30/19 134 lb 14.4 oz (61.2 kg) (18 %, Z= -0.92)*   05/17/19 132 lb 7.9 oz (60.1 kg) (15 %, Z= -1.04)*   05/09/19 139 lb (63 kg) (24 %, Z= -0.70)*     * Growth percentiles are based on CDC (Boys, 2-20 Years) data. Physical Exam   Constitutional: He is oriented to person, place, and time. He appears well-developed. HENT:   Head: Normocephalic. Pulmonary/Chest: Effort normal.   Genitourinary:   Genitourinary Comments: Slightly retracted testicle, incision looks good    Neurological: He is alert and oriented to person, place, and time. Skin: Skin is warm. Assessment/Plan:     Meli Cabrera was seen today for post-op check. Diagnoses and all orders for this visit:    S/P inguinal hernia repair        No follow-ups on file. Patient Instructions   Weight restrictions of 10 lbs until July 1st  May swim in chlorinated water or salt water no ponds  Okay for work on [employer] duty  Watch for signs of infection  Follow up in 2 weeks      Discusseduse, benefit, and side effects of prescribed medications. All patient questionsanswered. Pt voiced understanding.      Electronically signed by DICKSON Chirinos CNP on 6/3/2019 at 10:28 AM

## 2021-07-31 ENCOUNTER — HOSPITAL ENCOUNTER (EMERGENCY)
Age: 21
Discharge: HOME | End: 2021-07-31
Attending: EMERGENCY MEDICINE
Payer: COMMERCIAL

## 2021-07-31 ENCOUNTER — APPOINTMENT (OUTPATIENT)
Dept: GENERAL RADIOLOGY | Age: 21
End: 2021-07-31
Payer: COMMERCIAL

## 2021-07-31 VITALS
SYSTOLIC BLOOD PRESSURE: 114 MMHG | RESPIRATION RATE: 18 BRPM | DIASTOLIC BLOOD PRESSURE: 64 MMHG | HEART RATE: 83 BPM | HEIGHT: 72 IN | BODY MASS INDEX: 18.96 KG/M2 | WEIGHT: 140 LBS | TEMPERATURE: 98.8 F | OXYGEN SATURATION: 99 %

## 2021-07-31 DIAGNOSIS — S61.215A LACERATION OF LEFT RING FINGER WITHOUT FOREIGN BODY WITHOUT DAMAGE TO NAIL, INITIAL ENCOUNTER: Primary | ICD-10-CM

## 2021-07-31 PROCEDURE — 90715 TDAP VACCINE 7 YRS/> IM: CPT | Performed by: STUDENT IN AN ORGANIZED HEALTH CARE EDUCATION/TRAINING PROGRAM

## 2021-07-31 PROCEDURE — 90471 IMMUNIZATION ADMIN: CPT | Performed by: STUDENT IN AN ORGANIZED HEALTH CARE EDUCATION/TRAINING PROGRAM

## 2021-07-31 PROCEDURE — 12002 RPR S/N/AX/GEN/TRNK2.6-7.5CM: CPT

## 2021-07-31 PROCEDURE — 6360000002 HC RX W HCPCS: Performed by: STUDENT IN AN ORGANIZED HEALTH CARE EDUCATION/TRAINING PROGRAM

## 2021-07-31 PROCEDURE — 99281 EMR DPT VST MAYX REQ PHY/QHP: CPT

## 2021-07-31 RX ORDER — AMOXICILLIN AND CLAVULANATE POTASSIUM 875; 125 MG/1; MG/1
1 TABLET, FILM COATED ORAL DAILY
Qty: 14 TABLET | Refills: 0 | Status: SHIPPED | OUTPATIENT
Start: 2021-07-31 | End: 2021-08-14

## 2021-07-31 RX ORDER — LIDOCAINE HYDROCHLORIDE 10 MG/ML
INJECTION, SOLUTION INFILTRATION; PERINEURAL
Status: DISCONTINUED
Start: 2021-07-31 | End: 2021-08-01 | Stop reason: HOSPADM

## 2021-07-31 RX ADMIN — TETANUS TOXOID, REDUCED DIPHTHERIA TOXOID AND ACELLULAR PERTUSSIS VACCINE, ADSORBED 0.5 ML: 5; 2.5; 8; 8; 2.5 SUSPENSION INTRAMUSCULAR at 22:42

## 2021-07-31 ASSESSMENT — VISUAL ACUITY: OU: 1

## 2021-07-31 ASSESSMENT — ENCOUNTER SYMPTOMS
ALLERGIC/IMMUNOLOGIC NEGATIVE: 1
EYES NEGATIVE: 1
RESPIRATORY NEGATIVE: 1
GASTROINTESTINAL NEGATIVE: 1

## 2021-07-31 ASSESSMENT — PAIN DESCRIPTION - LOCATION: LOCATION: HAND

## 2021-07-31 ASSESSMENT — PAIN DESCRIPTION - PAIN TYPE: TYPE: ACUTE PAIN

## 2021-07-31 ASSESSMENT — PAIN DESCRIPTION - ORIENTATION: ORIENTATION: LEFT

## 2021-07-31 ASSESSMENT — PAIN SCALES - GENERAL: PAINLEVEL_OUTOF10: 6

## 2021-08-01 NOTE — ED PROVIDER NOTES
[address] [address]          Pt Name: Agustina Agosto  MRN: [id number]  [place] 2000  Date of evaluation: 7/31/2021  Treating Resident Physician: Luigi Alicea MD  Supervising Physician: Junito Kaba MD    [address] [place] [address]       Chief Complaint   Patient presents with    Laceration     History obtained from the patient. HISTORY OF PRESENT ILLNESS    HPI  Agustina Agosto is a 23 y.o. male who presents to the emergency department for evaluation of laceration of the left ring finger. The patient states that he was working with power tools/heavy machinery when a metal piece flew back out of the machine and cut the dorsum of his finger. He can still move the finger and denies numbness or paresthesias. Pt states he does not know when he received his last Tetanus booster. The patient has no other acute complaints at this time. REVIEW OF SYSTEMS   Review of Systems   Constitutional: Negative. HENT: Negative. Eyes: Negative. Respiratory: Negative. Cardiovascular: Negative. Gastrointestinal: Negative. Endocrine: Negative. Genitourinary: Negative. Musculoskeletal: Negative. Skin: Positive for wound. Allergic/Immunologic: Negative. Neurological: Negative. Hematological: Negative. Psychiatric/Behavioral: Negative. PAST MEDICAL AND SURGICAL HISTORY   History reviewed. No pertinent past medical history.   Past Surgical History:   Procedure Laterality Date    COCHLEAR IMPLANT  2017    left    HERNIA REPAIR Right 5/17/2019    OPEN RIGHT INGUINAL HERNIA REPAIR,MESH performed by Ebony Worthy MD at [address]     Current Facility-Administered Medications:     lidocaine 1 % injection, , , ,     Current Outpatient Medications:     amoxicillin-clavulanate (AUGMENTIN) 875-125 MG per tablet, Take 1 tablet by mouth daily for 14 days, Disp: 14 tablet, Rfl: 0      SOCIAL HISTORY     Social History with antiobiotics   Instructed patient on proper care for laceration with strict return instructions   Will discharge patient home with abx        ED RESULTS   Laboratory results:  Labs Reviewed - No data to display    Radiologic studies results:  No orders to display       ED Medications administered this visit:   Medications   lidocaine 1 % injection (has no administration in time range)   Tetanus-Diphth-Acell Pertussis (BOOSTRIX) injection 0.5 mL (0.5 mLs Intramuscular Given 7/31/21 2242)         ED COURSE      Lac Repair    Date/Time: 7/31/2021 10:31 PM  Performed by: Mya Landry MD  Authorized by: Natividad Del Cid DO     Consent:     Consent obtained:  Verbal    Consent given by:  Patient    Risks discussed:  Pain, tendon damage and infection    Alternatives discussed:  No treatment  Anesthesia (see MAR for exact dosages):      Anesthesia method:  Local infiltration    Local anesthetic:  Lidocaine 1% w/o epi  Laceration details:     Location:  Finger    Finger location:  L ring finger    Length (cm):  3    Depth (mm):  1  Repair type:     Repair type:  Simple  Pre-procedure details:     Preparation:  Patient was prepped and draped in usual sterile fashion  Exploration:     Hemostasis achieved with:  Direct pressure    Wound exploration: wound explored through full range of motion and entire depth of wound probed and visualized      Wound extent: no foreign bodies/material noted, no muscle damage noted, no tendon damage noted, no underlying fracture noted and no vascular damage noted      Contaminated: no    Treatment:     Area cleansed with:  Saline    Amount of cleaning:  Standard    Irrigation solution:  Sterile saline    Irrigation volume:  1L    Irrigation method:  Pressure wash    Visualized foreign bodies/material removed: no    Skin repair:     Repair method:  Sutures    Suture size:  3-0    Suture technique:  Simple interrupted  Approximation:     Approximation:  Close  Post-procedure details: Dressing:  Non-adherent dressing    Patient tolerance of procedure: Tolerated well, no immediate complications      Strict return precautions and follow up instructions were discussed with the patient prior to discharge, with which the patient agrees. MEDICATION CHANGES     New Prescriptions    AMOXICILLIN-CLAVULANATE (AUGMENTIN) 875-125 MG PER TABLET    Take 1 tablet by mouth daily for 14 days         FINAL DISPOSITION     Final diagnoses:   Laceration of left ring finger without foreign body without damage to nail, initial encounter     Condition: condition: stable  Dispo: Discharge to home      This transcription was electronically signed. Parts of this transcriptions may have been dictated by use of voice recognition software and electronically transcribed, and parts may have been transcribed with the assistance of an ED scribe. The transcription may contain errors not detected in proofreading. Please refer to my supervising physician's documentation if my documentation differs.     Electronically Signed: Avery Betts MD, 07/31/21, 11:09 PM        Avery Betts MD  Resident  07/31/21 [id number]

## 2021-08-01 NOTE — ED NOTES
Pt. Presents to the Ed with complaints of a laceration. Pt. States he was using a  to cut a weld off a piece of metal when the  slipped and he cut his finger. Pt. Presents with bleeding controlled at this time.       Bruce Romero RN  07/31/21 [id number]

## 2021-08-01 NOTE — ED NOTES
Patient educated on D/C instructions. All questions answered. Patient ambulates out of the department by self.       Elena Gatica RN  07/31/21 [id number]

## (undated) DEVICE — 3M™ WARMING BLANKET, UPPER BODY, 10 PER CASE, 42268: Brand: BAIR HUGGER™

## (undated) DEVICE — BREAST HERNIA PACK: Brand: MEDLINE INDUSTRIES, INC.

## (undated) DEVICE — GAUZE,SPONGE,8"X4",12PLY,XRAY,STRL,LF: Brand: MEDLINE

## (undated) DEVICE — GOWN,SIRUS,NONRNF,SETINSLV,XL,20/CS: Brand: MEDLINE

## (undated) DEVICE — GLOVE ORANGE PI 8   MSG9080

## (undated) DEVICE — PENROSE DRAIN 18 X .5" SILICONE: Brand: MEDLINE

## (undated) DEVICE — SOLUTION IV 1000ML LAC RINGERS PH 6.5 INJ USP VIAFLX PLAS

## (undated) DEVICE — GLOVE SURG SZ 65 THK91MIL LTX FREE SYN POLYISOPRENE

## (undated) DEVICE — COVER ARMBRD W13XL28.5IN IMPERV BLU FOR OP RM

## (undated) DEVICE — BLADE CLIPPER GEN PURP NS